# Patient Record
Sex: FEMALE | Race: WHITE | NOT HISPANIC OR LATINO | Employment: FULL TIME | URBAN - METROPOLITAN AREA
[De-identification: names, ages, dates, MRNs, and addresses within clinical notes are randomized per-mention and may not be internally consistent; named-entity substitution may affect disease eponyms.]

---

## 2018-08-15 ENCOUNTER — HISTORICAL (OUTPATIENT)
Dept: PHYSICAL THERAPY | Facility: HOSPITAL | Age: 43
End: 2018-08-15

## 2018-08-21 ENCOUNTER — HISTORICAL (OUTPATIENT)
Dept: PHYSICAL THERAPY | Facility: HOSPITAL | Age: 43
End: 2018-08-21

## 2018-08-23 ENCOUNTER — HISTORICAL (OUTPATIENT)
Dept: PHYSICAL THERAPY | Facility: HOSPITAL | Age: 43
End: 2018-08-23

## 2018-08-29 ENCOUNTER — HISTORICAL (OUTPATIENT)
Dept: PHYSICAL THERAPY | Facility: HOSPITAL | Age: 43
End: 2018-08-29

## 2018-08-31 ENCOUNTER — HISTORICAL (OUTPATIENT)
Dept: PHYSICAL THERAPY | Facility: HOSPITAL | Age: 43
End: 2018-08-31

## 2018-09-04 ENCOUNTER — HISTORICAL (OUTPATIENT)
Dept: PHYSICAL THERAPY | Facility: HOSPITAL | Age: 43
End: 2018-09-04

## 2018-09-06 ENCOUNTER — HISTORICAL (OUTPATIENT)
Dept: ADMINISTRATIVE | Facility: HOSPITAL | Age: 43
End: 2018-09-06

## 2018-09-06 LAB
ABS NEUT (OLG): 3.68 X10(3)/MCL (ref 2.1–9.2)
B-HCG SERPL QL: NEGATIVE
BASOPHILS # BLD AUTO: 0.04 X10(3)/MCL
BASOPHILS NFR BLD AUTO: 1 %
EOSINOPHIL # BLD AUTO: 0.53 10*3/UL
EOSINOPHIL NFR BLD AUTO: 7 %
ERYTHROCYTE [DISTWIDTH] IN BLOOD BY AUTOMATED COUNT: 13.2 % (ref 11.5–14.5)
HCT VFR BLD AUTO: 45.3 % (ref 35–46)
HGB BLD-MCNC: 14.8 GM/DL (ref 12–16)
IMM GRANULOCYTES # BLD AUTO: 0.03 10*3/UL
IMM GRANULOCYTES NFR BLD AUTO: 0 %
LYMPHOCYTES # BLD AUTO: 2.47 X10(3)/MCL
LYMPHOCYTES NFR BLD AUTO: 34 % (ref 13–40)
MCH RBC QN AUTO: 31 PG (ref 26–34)
MCHC RBC AUTO-ENTMCNC: 32.7 GM/DL (ref 31–37)
MCV RBC AUTO: 95 FL (ref 80–100)
MONOCYTES # BLD AUTO: 0.5 X10(3)/MCL
MONOCYTES NFR BLD AUTO: 7 % (ref 4–12)
NEUTROPHILS # BLD AUTO: 3.68 X10(3)/MCL
NEUTROPHILS NFR BLD AUTO: 51 X10(3)/MCL
PLATELET # BLD AUTO: 176 X10(3)/MCL (ref 130–400)
PMV BLD AUTO: 11.9 FL (ref 7.4–10.4)
RBC # BLD AUTO: 4.77 X10(6)/MCL (ref 4–5.2)
TSH SERPL-ACNC: 1.17 MIU/L (ref 0.36–3.74)
WBC # SPEC AUTO: 7.2 X10(3)/MCL (ref 4.5–11)

## 2018-09-07 ENCOUNTER — HISTORICAL (OUTPATIENT)
Dept: PHYSICAL THERAPY | Facility: HOSPITAL | Age: 43
End: 2018-09-07

## 2018-09-11 ENCOUNTER — HISTORICAL (OUTPATIENT)
Dept: PHYSICAL THERAPY | Facility: HOSPITAL | Age: 43
End: 2018-09-11

## 2018-09-13 ENCOUNTER — HISTORICAL (OUTPATIENT)
Dept: PHYSICAL THERAPY | Facility: HOSPITAL | Age: 43
End: 2018-09-13

## 2018-09-17 ENCOUNTER — HISTORICAL (OUTPATIENT)
Dept: PHYSICAL THERAPY | Facility: HOSPITAL | Age: 43
End: 2018-09-17

## 2018-09-20 ENCOUNTER — HISTORICAL (OUTPATIENT)
Dept: PHYSICAL THERAPY | Facility: HOSPITAL | Age: 43
End: 2018-09-20

## 2018-09-25 ENCOUNTER — HISTORICAL (OUTPATIENT)
Dept: PHYSICAL THERAPY | Facility: HOSPITAL | Age: 43
End: 2018-09-25

## 2018-09-27 ENCOUNTER — HISTORICAL (OUTPATIENT)
Dept: RADIOLOGY | Facility: HOSPITAL | Age: 43
End: 2018-09-27

## 2018-09-28 ENCOUNTER — HISTORICAL (OUTPATIENT)
Dept: PHYSICAL THERAPY | Facility: HOSPITAL | Age: 43
End: 2018-09-28

## 2018-10-02 ENCOUNTER — HISTORICAL (OUTPATIENT)
Dept: PHYSICAL THERAPY | Facility: HOSPITAL | Age: 43
End: 2018-10-02

## 2018-10-04 ENCOUNTER — HISTORICAL (OUTPATIENT)
Dept: PHYSICAL THERAPY | Facility: HOSPITAL | Age: 43
End: 2018-10-04

## 2018-10-09 ENCOUNTER — HISTORICAL (OUTPATIENT)
Dept: PHYSICAL THERAPY | Facility: HOSPITAL | Age: 43
End: 2018-10-09

## 2018-10-11 ENCOUNTER — HISTORICAL (OUTPATIENT)
Dept: PHYSICAL THERAPY | Facility: HOSPITAL | Age: 43
End: 2018-10-11

## 2018-11-27 ENCOUNTER — HISTORICAL (OUTPATIENT)
Dept: ADMINISTRATIVE | Facility: HOSPITAL | Age: 43
End: 2018-11-27

## 2018-11-27 LAB
ALBUMIN SERPL-MCNC: 3.7 GM/DL (ref 3.4–5)
ALBUMIN/GLOB SERPL: 1 RATIO (ref 1–2)
ALP SERPL-CCNC: 47 UNIT/L (ref 45–117)
ALT SERPL-CCNC: 27 UNIT/L (ref 12–78)
AST SERPL-CCNC: 14 UNIT/L (ref 15–37)
BILIRUB SERPL-MCNC: 0.3 MG/DL (ref 0.2–1)
BILIRUBIN DIRECT+TOT PNL SERPL-MCNC: <0.1 MG/DL
BILIRUBIN DIRECT+TOT PNL SERPL-MCNC: ABNORMAL MG/DL
BUN SERPL-MCNC: 18 MG/DL (ref 7–18)
CALCIUM SERPL-MCNC: 8.4 MG/DL (ref 8.5–10.1)
CHLORIDE SERPL-SCNC: 105 MMOL/L (ref 98–107)
CO2 SERPL-SCNC: 29 MMOL/L (ref 21–32)
CREAT SERPL-MCNC: 0.6 MG/DL (ref 0.6–1.3)
ERYTHROCYTE [DISTWIDTH] IN BLOOD BY AUTOMATED COUNT: 13.3 % (ref 11.5–14.5)
EST. AVERAGE GLUCOSE BLD GHB EST-MCNC: 91 MG/DL
GLOBULIN SER-MCNC: 3.1 GM/ML (ref 2.3–3.5)
GLUCOSE SERPL-MCNC: 79 MG/DL (ref 74–106)
HAV IGM SERPL QL IA: NONREACTIVE
HBA1C MFR BLD: 4.8 % (ref 4.2–6.3)
HBV CORE IGM SERPL QL IA: NONREACTIVE
HBV SURFACE AG SERPL QL IA: NEGATIVE
HCT VFR BLD AUTO: 43.8 % (ref 35–46)
HCV AB SERPL QL IA: NONREACTIVE
HGB BLD-MCNC: 14.6 GM/DL (ref 12–16)
HIV 1+2 AB+HIV1 P24 AG SERPL QL IA: NONREACTIVE
MCH RBC QN AUTO: 31.4 PG (ref 26–34)
MCHC RBC AUTO-ENTMCNC: 33.3 GM/DL (ref 31–37)
MCV RBC AUTO: 94.2 FL (ref 80–100)
PLATELET # BLD AUTO: 160 X10(3)/MCL (ref 130–400)
PMV BLD AUTO: 12.1 FL (ref 7.4–10.4)
POTASSIUM SERPL-SCNC: 3.8 MMOL/L (ref 3.5–5.1)
PROT SERPL-MCNC: 6.8 GM/DL (ref 6.4–8.2)
RBC # BLD AUTO: 4.65 X10(6)/MCL (ref 4–5.2)
SODIUM SERPL-SCNC: 140 MMOL/L (ref 136–145)
T PALLIDUM AB SER QL: NONREACTIVE
WBC # SPEC AUTO: 7.9 X10(3)/MCL (ref 4.5–11)

## 2018-11-29 ENCOUNTER — HISTORICAL (OUTPATIENT)
Dept: SURGERY | Facility: HOSPITAL | Age: 43
End: 2018-11-29

## 2018-11-29 LAB — B-HCG SERPL QL: NEGATIVE

## 2019-08-29 ENCOUNTER — HISTORICAL (OUTPATIENT)
Dept: RADIOLOGY | Facility: HOSPITAL | Age: 44
End: 2019-08-29

## 2020-07-01 ENCOUNTER — HISTORICAL (OUTPATIENT)
Dept: RADIOLOGY | Facility: HOSPITAL | Age: 45
End: 2020-07-01

## 2022-04-11 ENCOUNTER — HISTORICAL (OUTPATIENT)
Dept: ADMINISTRATIVE | Facility: HOSPITAL | Age: 47
End: 2022-04-11

## 2022-04-29 VITALS
DIASTOLIC BLOOD PRESSURE: 78 MMHG | SYSTOLIC BLOOD PRESSURE: 124 MMHG | WEIGHT: 125.25 LBS | HEIGHT: 59 IN | BODY MASS INDEX: 25.25 KG/M2

## 2022-04-29 NOTE — OP NOTE
* Final Report *   Document Contains Addenda  Indication for Surgery 44 y/o  with AUB, Dysmenorrhea Preoperative Diagnosis AUB, Dysmenorrhea Postoperative Diagnosis Secretory Endometrium  Abnormal Uterine Bleeding Operation Hysteroscopy D&C, Endometrial Curettage, Mirena Insertion   Surgeon(s) AMANDA Rizzo MD- Staff  DAVID Wells MD-HOII   Assistant JUAN LUIS Patel MD-HOIII Anesthesia General Estimated Blood Loss 5mL Urine Output 30mL Findings EUA revealed normal external genitalia, 8 week sized uterus, mobile. Adequate vaginal capacity noted, making patient a good candidate for a vaginal hysterectomy in the event she would need one in the future. Hysteroscopy revealed secretory endometrium, normal bilateral tubal ostia noted. No abnormal masses or lesions noted. Specimen(s) Endometrial Curettings Complications None Technique The patient was taken to the operating room with IV fluids running. General anesthesia was obtained without difficulty. The patient was prepped and draped in the routine fashion in the dorsal lithotomy position. Red rubber catheterization was performed and the bladder was drained. A timeout was performed and correct procedure and patient were confirmed.    A weighted speculum was inserted into the vagina and the cervix was visualized. A tenaculum was placed on the anterior aspect of the cervix. The hysteroscope was introduced through the cervix under direct visualization and dilation was performed with hydrodistention. The above findings were noted. The hysteroscope was removed and endometrial curettage was performed after cervix was dilated using Prat dilators to accommodate uterine curet. Specimen sent to pathology. Attention turned to Mirena insertion at this time. Uterus sounded to 7cm. Mirena device was inserted without difficulty and strings trimmed 3cm form the internal cervical os. All instruments were removed. Tenaculum sites were noted to be hemostatic.    Counts were correct times two.  Dr. Rizzo was present for the entire procedure. The patient tolerated the procedure well and was transferred to the recovery room in stable condition. Patient stable for discharge to home following recovery.    Addendum by Efrain Wells MD on November 29, 2018 22:59:22 CST (Verified)  IV Fluids  700mL    Hysteroscopy Fluids:  NaCl In 700  NaCl Out: 600  Deficit 80  Addendum by Latha Rizzo MD on November 30, 2018 11:06:31 CST (Verified)  I was present with the resident during all critical and key portions of the procedure and agree with the findings documented in the resident's note.  Result type: Operative Report   Result date: November 29, 2018 17:14 CST   Result status: Modified   Result title: [facility] GYN Op Note   Performed by: Efrain Wells MD on November 29, 2018 22:57 CST   Verified by: Efrain Wells MD on November 29, 2018 22:57 CST   Encounter info: [id number], [facility], Clinic Visit, 12/14/2018 -    Doc has been moved by HIM specialist to the correct encounter.

## 2022-05-04 NOTE — HISTORICAL [facility] CERNER
This is a historical note converted from Serena. Formatting and pictures may have been removed.  Please reference Serena for original formatting and attached multimedia. Patient seen and examined by myself on this morning. There have been no changes since her clinic visit with Dr Patel on 2018. Patient expressed understanding in her own words of the procedure that she is undergoing on today. She is here with her  Keenan ([phone]).  ?   Vitals:  /81  HR 70  RR 18  SpO2: 99% on RA  ?   PHYSICAL EXAM:  General: NAD, A/Ox3  Respiratory: CTAB, no wheezes, rales, or rhonchi  Cardiovascular: RRR no murmurs, rubs, or gallops  Abdomen: soft, nondistended, nontender to palpation, no hepatosplenomegaly, no masses palpated, normoactive bowel sounds  Extremities: no edema, no calf tenderness  ?  To OR for hysteroscopy D&C.  ?  H&P belew per Dr Patel  ?  ?History of Present Illness  42 y/o  with AUB presents for preoperative evaluation for Hysteroscopy D&C/Mirena IUD insertion. Patient reporting increased menstrual bleeding since her BTL about 7 years ago. Menses last 7-8 days, heavy, changing tampons/pads q1h, occurs monthly. Also reports dysmenorrhea. Was previously on OCPs to help with menses but stopped after BTL. Patient would like Mirena IUD for control of bleeding. Patient endorses dyspareunia and post-coital bleeding.  ?   ObGynHx   -?FTSVD X 1 -?6#11oz, C/S?x 2 with BTL  Menstrual triad: 14/R/7-8d  LMP 2018  Sexually active: 1 partner in past year  STI Hx: GC/CT in , HSV - last outbreak 2 months ago (occurs more with menstruation)  Pap Hx: Last pap 2017 (Dr. Banks).  MMG Hx: 2018 [facility]  Colonoscopy - last 15 years ago, mother with colon cancer at 48 y/o, normal, planning to repeat in near future  Review of Systems  Constitutional - denies fever, chills, unintended?weight loss  Cardiovascular - denies chest pain, palpitations  Respiratory - denies  shortness of breath, wheezing  Gastrointestinal - denies abdominal pain, acid reflux, nausea, vomiting, diarrhea, constipation, blood in the stool  Genitourinary - denies vaginal discharge, vulvovaginal itching, dysuria, hematuria  Musculoskeletal - denies back pain  Neurologic - denies numbness or tingling in her extremities  Physical Exam  General - AOx3 in NAD, appears well, appears stated age  Eye - EOMI  HENT - Normocephalic atraumatic. Good dentition.  Neck - Thyroid nontender, nonpalpable  Respiratory - LCTAB, no rales/rhonchi/wheezes  Cardiovascular - RRR, S1/S2  Gastrointestinal - Abdomen soft, nontender, NABS x 4.  Musculoskeletal?- No calf tenderness  Neurologic?- Normal strength and reflexes in bilateral upper and lower extremities.  Skin - No rashes/lesions/bruises.  ?   Per examination on 10/31 with Dr. Herrera:  Sterile speculum exam: vaginal mucosa normal in appearance. Pink. No masses/lesions. Cervix well visualized, extremely friable area in middle of cervix that is ectropion vs hard polyp. Os not visible with os finder.  Bimanual exam: Vaginal with moderate capacity. Uterus 8cm in size, no cervical motion tenderness. No descent, mobile, not broad. No adnexal fullness/tenderness. Normal urethra. Normal bladder.  Assessment/Plan  ?   44 y/o  with AUB, possible AUB-P,?and inability to identify cervical os?here for preoperative evaluation for hysteroscopy D&C, possible polypectomy or myomectomy, and Mirena IUD insertion.  ?  Will also treat empirically for endometritis, course of Doxycycline/Flagyl sent to pharmacy.  ?  Surgical consents signed and PACE appointment arranged. Labs ordered.  Wound care handout provided.  Preoperative antibiotics: none  DVT prophylaxis: SCDs  *plan to position patient while awake due to history of left arm radiculopathy*  Postoperative pain management: Ibuprofen/Tylenol  ?  Plan for Hysteroscopy D&C, possible polypectomy, and Mirena IUD insertion on Thursday,  11/29/2018.  Postop visit on Friday, 12/14/2018.  ?  Counseling  We discussed the risks of hysteroscopy include but are not limited to visceral or vascular injury, including injury to bowel and bladder, postoperative pain, bleeding including hemorrhage requiring transfusion, and infection. She agrees to blood transfusion in case of health or life threatening blood loss. She understands that risks of Mirena IUD insertion include uterine perforation and expulsion.  ?  ?  Discussed with Dr. Latha Rizzo.  ?  Alexus Delgado MD - HO3  [facility] Problem List/Past Medical History  ??Ongoing  ??Neuropathy  ??Tobacco user  ?Historical  ??No qualifying data  Left radiculopathy with numbness in left index finger - s/p PT and improved on gabapentin  Procedure/Surgical HistorySinus SX (04/15/1999)  Bilateral knee surgery  tubal ligation 2011   ?  Laparoscopic appendectomy 1999  Medications  ??GABAPENTIN CAP 300MG, 300 mg= 1 cap(s), Oral, qPM  Allergies  No Known Allergies  Social History  ??Alcohol  ???Current, Beer, 1-2 times per week, Started age 20 Years. Alcohol use interferes with work or home: No. Drinks more than intended: No. Others hurt by drinking: No. Ready to change: No. Household alcohol concerns: No., 10/31/2018  ???Current, Beer, 1-2 times per week, 06/18/2018  ??Employment/School  ???Employed, Work/School description: Invoicing. Activity level: Desk/Office. Highest education level: University degree(s)., 10/31/2018  ??Exercise  ???Exercise duration: 45. Exercise frequency: Daily. Exercise type: Walking, Aerobics., 10/31/2018  ??Home/Environment  ???Lives with Children, Spouse. Alcohol abuse in household: No. Substance abuse in household: No. Smoker in household: Yes. Injuries/Abuse/Neglect in household: No. Safe place to go: Yes., 10/31/2018  ??Nutrition/Health  ???Regular, Wants to lose weight: Yes. Sleeping concerns: No. Feels highly stressed: No., 10/31/2018  ??Sexual  ???Sexually active: Yes. Sexually  active at age 18 Years. Number of current partners 1. Number of lifetime partners 5. Sexual orientation: Heterosexual. Uses condoms: No. History of sexual abuse: No., 10/31/2018  ??Substance Abuse  ???Never, 06/18/2018  ??Tobacco  ???Current every day smoker, Cigarettes, No, 10 per day. Started age 20 Years. Previous treatment: None. Ready to change: No. Household tobacco concerns: No., 10/31/2018  ???Current every day smoker Use:. Cigarettes Type:. 20 per day. Started age 20 Years. Ready to change: No. Household tobacco concerns: No., 09/06/2018  Family History  ??Colon cancer 07-JUN-2017 17:45:38<$>: Mother.  ??Heart failure.: Father.  ??Rheumatoid arthritis: Brother.  Mother with colon cancer at 46 y/o  Immunizations  ?Vaccine ?Date ?Status   ?influenza virus vaccine, inactivated 10/31/2018 Given   ?  Diagnostic Results  (09/27/2018 09:04 CDT US Transvaginal Non-OB)  FINDINGS: The uterus is anteverted and measures 5.2 x 4.5 x 8.8 cm.  There are multiple nabothian cysts, measuring up to 1 cm in diameter.  The endometrium measures 1.8 cm in thickness. A mildly heterogeneous  hypoechoic rounded mass in the posterior myometrium measures 1.9 x 1.7  x 1.7 cm.  ?  There is a small amount of free fluid in the posterior cul-de-sac. The  right ovary measures 2.6 x 2.1 x 2.0 cm. The left ovary measures 3.3 x  2.2 x 2.0 cm. Mild follicular changes are noted in the bilateral  ovaries. There is flow to the bilateral ovaries. No adnexal mass is  identified.  ?  ?  IMPRESSION:?  1. Thickened endometrium measuring up to 1.8 cm. In a premenopausal  patient, finding is nonspecific and may relate to endometrial  hyperplasia. Continued close follow-up evaluation and/or correlation  with endometrial biopsy may be considered.  2. Small uterine fibroid.  3. Small amount of free fluid in the posterior cul-de-sac, which is  likely physiologic.  4. Nabothian cysts.  ? [1]    /

## 2023-10-07 ENCOUNTER — LAB VISIT (OUTPATIENT)
Dept: LAB | Facility: HOSPITAL | Age: 48
End: 2023-10-07
Attending: INTERNAL MEDICINE
Payer: COMMERCIAL

## 2023-10-07 DIAGNOSIS — R06.09 DYSPNEA ON EXERTION: Primary | ICD-10-CM

## 2023-10-07 LAB
CHOLEST SERPL-MCNC: 197 MG/DL
CHOLEST/HDLC SERPL: 5 {RATIO} (ref 0–5)
HDLC SERPL-MCNC: 36 MG/DL (ref 35–60)
LDLC SERPL CALC-MCNC: 143 MG/DL (ref 50–140)
TRIGL SERPL-MCNC: 91 MG/DL (ref 37–140)
VLDLC SERPL CALC-MCNC: 18 MG/DL

## 2023-10-07 PROCEDURE — 80061 LIPID PANEL: CPT

## 2023-10-07 PROCEDURE — 36415 COLL VENOUS BLD VENIPUNCTURE: CPT

## 2023-11-03 DIAGNOSIS — R91.1 COIN LESION: Primary | ICD-10-CM

## 2023-11-03 DIAGNOSIS — R06.09 DYSPNEA ON EXERTION: ICD-10-CM

## 2023-11-09 ENCOUNTER — HOSPITAL ENCOUNTER (OUTPATIENT)
Dept: RADIOLOGY | Facility: HOSPITAL | Age: 48
Discharge: HOME | End: 2023-11-09
Attending: INTERNAL MEDICINE
Payer: COMMERCIAL

## 2023-11-09 DIAGNOSIS — R91.1 COIN LESION: ICD-10-CM

## 2023-11-09 DIAGNOSIS — R06.09 DYSPNEA ON EXERTION: ICD-10-CM

## 2023-11-09 PROCEDURE — 71046 X-RAY EXAM CHEST 2 VIEWS: CPT | Mod: TC

## 2024-03-16 ENCOUNTER — LAB VISIT (OUTPATIENT)
Dept: LAB | Facility: HOSPITAL | Age: 49
End: 2024-03-16
Attending: INTERNAL MEDICINE
Payer: COMMERCIAL

## 2024-03-16 DIAGNOSIS — E78.00 PURE HYPERCHOLESTEROLEMIA: Primary | ICD-10-CM

## 2024-03-16 LAB
ALBUMIN SERPL-MCNC: 3.8 G/DL (ref 3.5–5)
ALP SERPL-CCNC: 66 UNIT/L (ref 40–150)
ALT SERPL-CCNC: 21 UNIT/L (ref 0–55)
AST SERPL-CCNC: 16 UNIT/L (ref 5–34)
BILIRUB SERPL-MCNC: 0.4 MG/DL
BILIRUBIN DIRECT+TOT PNL SERPL-MCNC: 0.2 MG/DL (ref 0–0.8)
BILIRUBIN DIRECT+TOT PNL SERPL-MCNC: 0.2 MG/DL (ref 0–?)
CHOLEST SERPL-MCNC: 131 MG/DL
CHOLEST/HDLC SERPL: 3 {RATIO} (ref 0–5)
HDLC SERPL-MCNC: 46 MG/DL (ref 35–60)
LDLC SERPL CALC-MCNC: 73 MG/DL (ref 50–140)
PROT SERPL-MCNC: 6.5 GM/DL (ref 6.4–8.3)
TRIGL SERPL-MCNC: 61 MG/DL (ref 37–140)
VLDLC SERPL CALC-MCNC: 12 MG/DL

## 2024-03-16 PROCEDURE — 80061 LIPID PANEL: CPT

## 2024-03-16 PROCEDURE — 36415 COLL VENOUS BLD VENIPUNCTURE: CPT

## 2024-03-16 PROCEDURE — 80076 HEPATIC FUNCTION PANEL: CPT

## 2025-07-26 ENCOUNTER — LAB VISIT (OUTPATIENT)
Dept: LAB | Facility: HOSPITAL | Age: 50
End: 2025-07-26
Attending: OTOLARYNGOLOGY
Payer: COMMERCIAL

## 2025-07-26 DIAGNOSIS — E03.9 HYPOTHYROIDISM, UNSPECIFIED TYPE: Primary | ICD-10-CM

## 2025-07-26 LAB — TSH SERPL-ACNC: 0.88 UIU/ML (ref 0.35–4.94)

## 2025-07-26 PROCEDURE — 84443 ASSAY THYROID STIM HORMONE: CPT

## 2025-07-26 PROCEDURE — 36415 COLL VENOUS BLD VENIPUNCTURE: CPT
